# Patient Record
Sex: FEMALE | Race: WHITE | HISPANIC OR LATINO | Employment: PART TIME | URBAN - METROPOLITAN AREA
[De-identification: names, ages, dates, MRNs, and addresses within clinical notes are randomized per-mention and may not be internally consistent; named-entity substitution may affect disease eponyms.]

---
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
## 2018-01-13 VITALS
BODY MASS INDEX: 17.89 KG/M2 | HEART RATE: 80 BPM | TEMPERATURE: 98.6 F | SYSTOLIC BLOOD PRESSURE: 98 MMHG | HEIGHT: 63 IN | WEIGHT: 100.97 LBS | DIASTOLIC BLOOD PRESSURE: 64 MMHG

## 2018-01-16 NOTE — MISCELLANEOUS
Reason For Visit  Reason For Visit Free Text Note Form: Assistance with [address] referral     Case Management Documentation [facility]:   Information obtained from the patient and medical record girlfriend  Other needs and concerns include psych and [employer]  Action Plan: supportive counseling/advocacy and information provided  plan reviewed  Progress Note  SW met with this 22 y/o single female pt and her girlfriend this date  Pt relates she is unhappy with her current therapist and psychiatrist and is interested in additional resources  SW has provided pt with a list of area providers in her insurance network and has attempted to help pt call  However, SW could not get through at this time  Pt to f/u on her own with her friend's assistance  Pt relates her friend also assist her with transportation  Pt denies need of further assistance e at this time  Active Problems    1  Allergic rhinitis (477 9) (J30 9)   2  Bipolar disorder (296 80) (F31 9)   3  Dysuria (788 1) (R30 0)   4  Dysuria (788 1) (R30 0)   5  Need for influenza vaccination (V04 81) (Z23)   6  Routine screening for STI (sexually transmitted infection) (V74 5) (Z11 3)    Current Meds   1  Gabapentin 100 MG Oral Capsule; TAKE 1 CAPSULE 3 TIMES DAILY; Therapy: (Recorded:[id number]) to Recorded   2  LaMICtal 100 MG Oral Tablet (LamoTRIgine); TAKE 1 TABLET DAILY; Therapy: (Recorded:[id number]) to Recorded   3  Lithium Carbonate 300 MG Oral Capsule; TAKE 1 CAPSULE TWICE DAILY; Therapy: (Recorded:[id number]) to Recorded    Allergies    1  Penicillins    2  No Known Environmental Allergies   3   No Known Food Allergies    Signatures   Electronically signed by : Jared Gonzalez LCSW; Mar  6 2017  1:45PM EST                       (Author)

## 2018-02-24 ENCOUNTER — HOSPITAL ENCOUNTER (EMERGENCY)
Facility: HOSPITAL | Age: 18
Discharge: HOME | End: 2018-02-24
Attending: EMERGENCY MEDICINE | Admitting: EMERGENCY MEDICINE
Payer: COMMERCIAL

## 2018-02-24 VITALS
SYSTOLIC BLOOD PRESSURE: 108 MMHG | HEART RATE: 74 BPM | WEIGHT: 107 LBS | DIASTOLIC BLOOD PRESSURE: 70 MMHG | OXYGEN SATURATION: 99 % | BODY MASS INDEX: 19.69 KG/M2 | RESPIRATION RATE: 18 BRPM | TEMPERATURE: 97.9 F | HEIGHT: 62 IN

## 2018-02-24 DIAGNOSIS — N39.0 UTI (URINARY TRACT INFECTION): Primary | ICD-10-CM

## 2018-02-24 LAB
BACTERIA UR QL AUTO: ABNORMAL /HPF
BILIRUB UR QL STRIP: ABNORMAL
CLARITY UR: ABNORMAL
COLOR UR: YELLOW
EXT PREG TEST URINE: NEGATIVE
GLUCOSE UR STRIP-MCNC: NEGATIVE MG/DL
HGB UR QL STRIP.AUTO: ABNORMAL
KETONES UR STRIP-MCNC: NEGATIVE MG/DL
LEUKOCYTE ESTERASE UR QL STRIP: ABNORMAL
NITRITE UR QL STRIP: NEGATIVE
NON-SQ EPI CELLS URNS QL MICRO: ABNORMAL /HPF
PH UR STRIP.AUTO: 5.5 [PH] (ref 4.5–8)
PROT UR STRIP-MCNC: ABNORMAL MG/DL
RBC #/AREA URNS AUTO: ABNORMAL /HPF
SP GR UR STRIP.AUTO: >=1.03 (ref 1–1.03)
UROBILINOGEN UR QL STRIP.AUTO: 0.2 E.U./DL
WBC #/AREA URNS AUTO: ABNORMAL /HPF

## 2018-02-24 PROCEDURE — 87086 URINE CULTURE/COLONY COUNT: CPT

## 2018-02-24 PROCEDURE — 81001 URINALYSIS AUTO W/SCOPE: CPT

## 2018-02-24 PROCEDURE — 99283 EMERGENCY DEPT VISIT LOW MDM: CPT

## 2018-02-24 PROCEDURE — 81025 URINE PREGNANCY TEST: CPT | Performed by: EMERGENCY MEDICINE

## 2018-02-24 PROCEDURE — 87147 CULTURE TYPE IMMUNOLOGIC: CPT

## 2018-02-24 PROCEDURE — 87186 SC STD MICRODIL/AGAR DIL: CPT

## 2018-02-24 PROCEDURE — 87491 CHLMYD TRACH DNA AMP PROBE: CPT | Performed by: EMERGENCY MEDICINE

## 2018-02-24 PROCEDURE — 87591 N.GONORRHOEAE DNA AMP PROB: CPT | Performed by: EMERGENCY MEDICINE

## 2018-02-24 RX ORDER — NITROFURANTOIN 25; 75 MG/1; MG/1
100 CAPSULE ORAL 2 TIMES DAILY
Qty: 9 CAPSULE | Refills: 0 | Status: SHIPPED | OUTPATIENT
Start: 2018-02-24 | End: 2018-03-01

## 2018-02-24 RX ORDER — NITROFURANTOIN 25; 75 MG/1; MG/1
100 CAPSULE ORAL ONCE
Status: COMPLETED | OUTPATIENT
Start: 2018-02-24 | End: 2018-02-24

## 2018-02-24 RX ORDER — NITROFURANTOIN 25; 75 MG/1; MG/1
100 CAPSULE ORAL 2 TIMES DAILY WITH MEALS
Status: DISCONTINUED | OUTPATIENT
Start: 2018-02-24 | End: 2018-02-24

## 2018-02-24 RX ADMIN — NITROFURANTOIN (MONOHYDRATE/MACROCRYSTALS) 100 MG: 75; 25 CAPSULE ORAL at 13:36

## 2018-02-24 NOTE — ED PROVIDER NOTES
History  Chief Complaint   Patient presents with    Possible UTI     Pt  stated that she has been complaining of burning, frequency, and urgency  Pt  stated that she now has been urinating some blood  Pt  stated that she has also a thick white discharge  19 yr female with 2 weeks of intermitent urinary frequency - now with blood in urine- no fevers- flank /abdominal pain/ vomiting- alos with 2 weeks of clear new vag d/c- no vag lesions--         History provided by:  Patient and friend   used: No        None       Past Medical History:   Diagnosis Date    Anxiety     Bipolar 1 disorder ([facility] [address] )        Past Surgical History:   Procedure Laterality Date    WISDOM TOOTH EXTRACTION         History reviewed  No pertinent family history  I have reviewed and agree with the history as documented  Social History   Substance Use Topics    Smoking status: Never Smoker    Smokeless tobacco: Never Used    Alcohol use No        Review of Systems   Constitutional: Negative  HENT: Negative  Eyes: Negative  Respiratory: Negative  Cardiovascular: Negative  Gastrointestinal: Negative  Endocrine: Negative  Genitourinary: Positive for difficulty urinating, frequency, hematuria and vaginal discharge  Negative for decreased urine volume, dyspareunia, dysuria, enuresis, flank pain, genital sores, menstrual problem, pelvic pain, urgency, vaginal bleeding and vaginal pain  Musculoskeletal: Negative  Allergic/Immunologic: Negative  Neurological: Negative  Hematological: Negative  Psychiatric/Behavioral: Negative          Physical Exam  ED Triage Vitals [02/24/18 1037]   Temperature Pulse Respirations Blood Pressure SpO2   97 9 °F (36 6 °C) 72 20 118/81 98 %      Temp Source Heart Rate Source Patient Position - Orthostatic VS BP Location FiO2 (%)   Oral Monitor Lying Right arm --      Pain Score       No Pain           Orthostatic Vital Signs  Vitals:    02/24/18 1037 02/24/18 1045 02/24/18 1200   BP: 118/81 118/81 111/71   Pulse: 72 80 81   Patient Position - Orthostatic VS: Lying         Physical Exam   Constitutional: She is oriented to person, place, and time  She appears well-developed and well-nourished  No distress  avss-- pulse ox 99 % on ra- intepretation is normal- no intervention    HENT:   Head: Normocephalic and atraumatic  Eyes: Conjunctivae and EOM are normal  Pupils are equal, round, and reactive to light  Right eye exhibits no discharge  Left eye exhibits no discharge  No scleral icterus  Mm pink   Neck: Normal range of motion  Neck supple  No JVD present  No tracheal deviation present  No thyromegaly present  Cardiovascular: Normal rate, regular rhythm, normal heart sounds and intact distal pulses  Exam reveals no gallop and no friction rub  No murmur heard  Pulmonary/Chest: Effort normal and breath sounds normal  No stridor  No respiratory distress  She has no wheezes  She has no rales  She exhibits no tenderness  Abdominal: Soft  Bowel sounds are normal  She exhibits no distension and no mass  There is no tenderness  There is no rebound and no guarding  No hernia  No supraumbilical/ cva tendneress- very soft abd- no peritoenal signs   Musculoskeletal: Normal range of motion  She exhibits no edema, tenderness or deformity  Lymphadenopathy:     She has no cervical adenopathy  Neurological: She is alert and oriented to person, place, and time  No cranial nerve deficit or sensory deficit  She exhibits normal muscle tone  Coordination normal    Skin: Skin is warm  Capillary refill takes less than 2 seconds  No rash noted  She is not diaphoretic  No erythema  No pallor  Psychiatric: She has a normal mood and affect  Her behavior is normal  Judgment and thought content normal    Nursing note and vitals reviewed        ED Medications  Medications   nitrofurantoin (MACROBID) extended-release capsule 100 mg (not administered)       Diagnostic Studies  Results Reviewed     Procedure Component Value Units Date/Time    Urine Microscopic [[id number]]  (Abnormal) Collected:  02/24/18 1123    Lab Status:  Final result Specimen:  Urine from Urine, Clean Catch Updated:  02/24/18 1147     RBC, UA 0-1 (A) /hpf      WBC, UA 10-20 (A) /hpf      Epithelial Cells Occasional /hpf      Bacteria, UA Occasional /hpf     Urine culture [[id number]] Collected:  02/24/18 1123    Lab Status:   In process Specimen:  Urine from Urine, Clean Catch Updated:  02/24/18 1147    POCT pregnancy, urine [[id number]]  (Normal) Resulted:  02/24/18 1136    Lab Status:  Final result Specimen:  Urine Updated:  02/24/18 1136     EXT PREG TEST UR (Ref: Negative) Negative    Chlamydia/GC amplified DNA by PCR [[id number]]     Lab Status:  No result Specimen:  Genital from Endocervical     ED Urine Macroscopic [[id number]]  (Abnormal) Collected:  02/24/18 1123    Lab Status:  Final result Specimen:  Urine Updated:  02/24/18 1122     Color, UA Yellow     Clarity, UA Cloudy     pH, UA 5 5     Leukocytes, UA Trace (A)     Nitrite, UA Negative     Protein, UA 30 (1+) (A) mg/dl      Glucose, UA Negative mg/dl      Ketones, UA Negative mg/dl      Urobilinogen, UA 0 2 E U /dl      Bilirubin, UA Interference- unable to analyze (A)     Blood, UA Moderate (A)     Specific Gravity, UA >=1 030    Narrative:       CLINITEK RESULT                 No orders to display              Procedures  Procedures       Phone Contacts  ED Phone Contact    ED Course  ED Course as of Feb 24 1329   Sat Feb 24, 2018   1328 Er md  procedure note-- pelvic exam -- with er tech present- normal external exam- no d c in vault- diffuse uterine/ adnexal /cervical tendneress- no masses                                MDM  CritCare Time    Disposition  Final diagnoses:   None     ED Disposition     None      Follow-up Information    None       Patient's Medications   Discharge Prescriptions    No medications on file     No discharge procedures on file     ED Provider  Electronically Signed by           Irish Echevarria MD  02/24/18 4902

## 2018-02-24 NOTE — DISCHARGE INSTRUCTIONS
Diagnosis; uti    - macrobid - antibiotic - 1 capsule 2 times per day for 5 days    - if the pelvic culture grows   Out any bacterial we will contact you     - please return to  The er for any fevers- temp > 100 4- any flank pain/ vomiting - these are signs of an kidney infection- or any new/ worsening/concerning symptoms to you      -  If vaginal discharge continues - please call your gyn to schedule an appt

## 2018-02-27 LAB
BACTERIA UR CULT: ABNORMAL
CHLAMYDIA DNA CVX QL NAA+PROBE: NORMAL
N GONORRHOEA DNA GENITAL QL NAA+PROBE: NORMAL

## 2018-05-13 PROBLEM — F31.9 BIPOLAR DISORDER (HCC): Status: ACTIVE | Noted: 2017-02-16

## 2018-05-13 RX ORDER — SERTRALINE HYDROCHLORIDE 25 MG/1
12.5 TABLET, FILM COATED ORAL
COMMUNITY
Start: 2016-10-21

## 2018-05-13 RX ORDER — LAMOTRIGINE 100 MG/1
1 TABLET ORAL DAILY
COMMUNITY

## 2018-05-13 RX ORDER — LITHIUM CARBONATE 300 MG/1
1 CAPSULE ORAL 2 TIMES DAILY
COMMUNITY

## 2018-05-13 RX ORDER — GABAPENTIN 100 MG/1
1 CAPSULE ORAL 3 TIMES DAILY
COMMUNITY

## 2018-08-15 ENCOUNTER — APPOINTMENT (OUTPATIENT)
Dept: LAB | Facility: HOSPITAL | Age: 18
End: 2018-08-15
Payer: COMMERCIAL

## 2018-08-15 ENCOUNTER — OFFICE VISIT (OUTPATIENT)
Dept: INTERNAL MEDICINE CLINIC | Facility: CLINIC | Age: 18
End: 2018-08-15
Payer: COMMERCIAL

## 2018-08-15 VITALS
SYSTOLIC BLOOD PRESSURE: 96 MMHG | HEART RATE: 72 BPM | BODY MASS INDEX: 18.28 KG/M2 | HEIGHT: 63 IN | TEMPERATURE: 98.6 F | WEIGHT: 103.17 LBS | DIASTOLIC BLOOD PRESSURE: 68 MMHG

## 2018-08-15 DIAGNOSIS — Z02.0 SCHOOL PHYSICAL EXAM: ICD-10-CM

## 2018-08-15 DIAGNOSIS — Z11.1 SCREENING FOR TUBERCULOSIS: Primary | ICD-10-CM

## 2018-08-15 DIAGNOSIS — Z11.1 SCREENING FOR TUBERCULOSIS: ICD-10-CM

## 2018-08-15 PROCEDURE — 36415 COLL VENOUS BLD VENIPUNCTURE: CPT

## 2018-08-15 PROCEDURE — 3008F BODY MASS INDEX DOCD: CPT | Performed by: NURSE PRACTITIONER

## 2018-08-15 PROCEDURE — 3725F SCREEN DEPRESSION PERFORMED: CPT | Performed by: NURSE PRACTITIONER

## 2018-08-15 PROCEDURE — 99213 OFFICE O/P EST LOW 20 MIN: CPT | Performed by: NURSE PRACTITIONER

## 2018-08-15 PROCEDURE — 86480 TB TEST CELL IMMUN MEASURE: CPT

## 2018-08-15 NOTE — PROGRESS NOTES
Assessment/Plan:     Diagnoses and all orders for this visit:    Screening for tuberculosis  -     Quantiferon TB Gold; Future  -     Patient wants us to fax results to her school    School physical exam        -     Negative physical examination        -     Advised to be under the care of psychiatrist at all times        -     To call if suicidal thoughts occur        -      Advised to restart meds ASAP        -     Copy of vaccination record given to patient        Subjective: presents to the clinic for school physical     Patient ID: Zaynab Chew is a 19 y o  female  HPI  She reports she has to start school tomorrow and is here for physical examination  Brought her own form  Has ongoing diagnosis of depression and bipolar disorder  PHQ-9 score is 8  She does report she sometimes gets thoughts of hurting herself, and the last time was 3 days ago  Says these thoughts occur during very stressful times  Right now she is stressed b/c she is preparing for school  She used to be under the care of psychiatrist but says she lost her insurance and stopped going to the psychiatrist  Also that she did not really like the psychiatrist and wanted to switch  Patient is advised to get into care with another psychiatrist [facility], as school can be very stressful  She needs to get back under her meds ASAP  She had stopped taking her meds even before she lost her insurance  Says at one point she had been changed to lithium but that did not work well for her  She is not sure what meds she was on that gave her some level of stability in her mind   Since several meds haven't worked for her, I will leave the f/u treatments with the psych team      The following portions of the patient's history were reviewed and updated as appropriate: allergies, current medications, past family history, past medical history, past social history, past surgical history and problem list     Review of Systems   Constitutional: Negative for appetite change, chills, fatigue and unexpected weight change  Respiratory: Negative for cough, chest tightness, shortness of breath and wheezing  Cardiovascular: Negative for chest pain and palpitations  Gastrointestinal: Negative for abdominal pain, blood in stool, diarrhea, nausea and vomiting  Musculoskeletal: Negative for back pain, gait problem, myalgias and neck pain  Skin: Negative for color change, pallor, rash and wound  Psychiatric/Behavioral: Positive for self-injury and suicidal ideas (none currently, last episode about 3 days ago  no plans in place  )  Negative for behavioral problems, decreased concentration and sleep disturbance  The patient is not nervous/anxious and is not hyperactive  Objective:      BP 96/68 (BP Location: Right arm, Patient Position: Sitting, Cuff Size: Adult)   Pulse 72   Temp 98 6 °F (37 °C) (Oral)   Ht 5' 3" (1 6 m)   Wt 46 8 kg (103 lb 2 8 oz)   BMI 18 28 kg/m²          Physical Exam   Constitutional: She is oriented to person, place, and time  She appears well-developed and well-nourished  No distress  HENT:   Head: Normocephalic and atraumatic  Nose: Nose normal    Mouth/Throat: No oropharyngeal exudate  Increased cerumen in both ears   Eyes: Conjunctivae and EOM are normal  Pupils are equal, round, and reactive to light  Right eye exhibits no discharge  Left eye exhibits no discharge  wearing eye glasses for near sightedness   Neck: Normal range of motion  Neck supple  Cardiovascular: Normal rate, regular rhythm and normal heart sounds  No murmur heard  Pulmonary/Chest: Effort normal and breath sounds normal  No respiratory distress  She has no wheezes  Abdominal: Soft  Bowel sounds are normal  She exhibits no distension and no mass  There is no tenderness  Lymphadenopathy:     She has no cervical adenopathy  Neurological: She is alert and oriented to person, place, and time  She has normal reflexes  She exhibits normal muscle tone  Skin: Skin is warm and dry  No rash noted  She is not diaphoretic  No erythema  Vitals reviewed

## 2018-08-17 ENCOUNTER — TELEPHONE (OUTPATIENT)
Dept: INTERNAL MEDICINE CLINIC | Facility: CLINIC | Age: 18
End: 2018-08-17

## 2018-08-17 LAB
ANNOTATION COMMENT IMP: NORMAL
GAMMA INTERFERON BACKGROUND BLD IA-ACNC: 0.14 IU/ML
M TB IFN-G BLD-IMP: NEGATIVE
M TB IFN-G CD4+ BCKGRND COR BLD-ACNC: <0.01 IU/ML
M TB IFN-G CD4+ T-CELLS BLD-ACNC: 0.13 IU/ML
MITOGEN IGNF BLD-ACNC: 8.53 IU/ML
QUANTIFERON-TB GOLD IN TUBE: NORMAL
SERVICE CMNT-IMP: NORMAL

## 2018-11-12 ENCOUNTER — HOSPITAL ENCOUNTER (EMERGENCY)
Facility: HOSPITAL | Age: 18
Discharge: HOME | End: 2018-11-12
Attending: EMERGENCY MEDICINE | Admitting: EMERGENCY MEDICINE
Payer: COMMERCIAL

## 2018-11-12 VITALS
HEART RATE: 87 BPM | DIASTOLIC BLOOD PRESSURE: 74 MMHG | BODY MASS INDEX: 19.31 KG/M2 | TEMPERATURE: 98.3 F | SYSTOLIC BLOOD PRESSURE: 110 MMHG | WEIGHT: 109 LBS | OXYGEN SATURATION: 98 % | RESPIRATION RATE: 18 BRPM

## 2018-11-12 DIAGNOSIS — J35.3 ENLARGED TONSILS AND ADENOIDS: Primary | ICD-10-CM

## 2018-11-12 LAB — S PYO AG THROAT QL: NEGATIVE

## 2018-11-12 PROCEDURE — 87430 STREP A AG IA: CPT | Performed by: PHYSICIAN ASSISTANT

## 2018-11-12 PROCEDURE — 99283 EMERGENCY DEPT VISIT LOW MDM: CPT

## 2018-11-12 RX ADMIN — DEXAMETHASONE SODIUM PHOSPHATE 10 MG: 10 INJECTION, SOLUTION INTRAMUSCULAR; INTRAVENOUS at 16:49

## 2018-11-12 NOTE — DISCHARGE INSTRUCTIONS
Tonsillectomy   WHAT YOU SHOULD KNOW:   A tonsillectomy is surgery to remove your tonsils  Tonsils are 2 large lumps of tissue in the back of your throat  Adenoids are small lumps of tissue on the top of your throat  Tonsils and adenoids both fight infection  Sometimes only your tonsils are removed  Your adenoids may be taken out at the same time if they are large or infected  AFTER YOU LEAVE:   Medicines:   · NSAIDs:  These medicines decrease swelling and pain  You can buy NSAIDs without a doctor's order  Ask your primary healthcare provider which medicine is right for you, and how much to take  Take as directed  NSAIDs can cause stomach bleeding or kidney problems if not taken correctly  Do not take aspirin  This can increase your risk of bleeding  · Acetaminophen: This medicine is available without a doctor's order  It may decrease your pain and fever  Ask how much medicine you need and how often to take it  · Pain medicines: You may be given a prescription medicine to decrease pain  Do not wait until the pain is severe before you take this medicine  · Antibiotics: This medicine is given to fight or prevent an infection caused by bacteria  Take as directed  · Take your medicine as directed  Call your healthcare provider if you think your medicine is not helping or if you have side effects  Tell him if you are allergic to any medicine  Keep a list of the medicines, vitamins, and herbs you take  Include the amounts, and when and why you take them  Bring the list or the pill bottles to follow-up visits  Carry your medicine list with you in case of an emergency  Follow up with your healthcare provider as directed:  Write down your questions so you remember to ask them during your visits  What to expect after surgery:   · Pain and swelling: Your throat may be sore up to 2 weeks after surgery  Your face and neck may be swollen or tender  It may be hard to turn your head       · Mild fever: You may have a low fever while your tonsil areas heal  Drink liquids often to help reduce it  · Bleeding:  A small amount of bleeding is normal within 24 hours after surgery  Bleeding can also happen 5 to 7 days after surgery when your scabs fall off, or if you have an infection  Ask how much bleeding to expect  Mouth care: It is normal to have mouth pain and bad breath for a few days after surgery  Care for your mouth as follows:  · Brush your teeth gently  Avoid harsh gargling or tooth brushing  This can cause bleeding  · Gently rinse your mouth as directed to remove blood and mucus  Food and drink:  You will need to follow a liquid diet or soft food diet for several days after surgery  · Drink plenty of liquids: This will help prevent fluid loss, keep your temperature down, decrease pain, and speed healing  Liquids and foods that are cool or cold, such as water, apple or grape juice, and popsicles, will help decrease pain and swelling  Do not drink orange juice or grapefruit juice  They may bother your throat  · Start with soft foods: Once you can drink liquids and your stomach is not upset, you may then have soft, plain foods  Begin with foods like applesauce, oatmeal, soft-boiled eggs, mashed potatoes, gelatin, and ice cream  Once you can eat soft food easily, you may slowly begin to eat solid foods  Avoid anything hot, spicy, or sharp, such as chips  These foods can hurt your tonsil areas  · Avoid hot food and drinks:  Avoid coffee, tea, soup, and other hot or warm foods and drinks  They can increase your risk for bleeding  Avoid milk and dairy foods if you have problems with thick mucus in your throat  This can cause you to cough, which could hurt your surgery areas  Self-care:   · Use ice:  Ice helps decrease swelling and pain  Use an ice pack or put crushed ice in a plastic bag   Cover the ice pack with a towel and place it on your throat for 15 to 20 minutes every hour for 2 days     · Use a cool humidifier: This will help moisten the air and soothe your throat  · Get plenty of rest:  Limit your activity for 7 to 10 days after surgery  It may take 2 weeks for you to recover  Ask when you can drive or return to work  · Do not smoke or go to smoky areas:  Until you heal, smoke may cause you to cough or your throat to start bleeding heavily  · Stay away from people who have colds, sore throats, or the flu: You may get sick more easily after surgery  Contact your surgeon or primary healthcare provider if:   · You have a fever  · You have throat pain or an earache that is worse than expected  · You have pus or blood draining down your throat  · You have itchy skin or a rash  · You have any questions or concerns about your condition or care  Seek care immediately or call 911 if:   · You have bright red bleeding from your nose or mouth, or bleeding that is worse than what you were told to expect  · You feel weak, dizzy, or like you might faint when you sit up or stand  · You have severe throat pain with drooling or voice changes  · Your neck is stiff and painful  · You have swelling or pain in your face or neck  · You have back or chest pain  · You have trouble breathing or swallowing  © 2014 [employer] is for End User's use only and may not be sold, redistributed or otherwise used for commercial purposes  All illustrations and images included in CareNotes® are the copyrighted property of [employer]  or [employer]  The above information is an  only  It is not intended as medical advice for individual conditions or treatments  Talk to your doctor, nurse or pharmacist before following any medical regimen to see if it is safe and effective for you

## 2018-11-12 NOTE — ED PROVIDER NOTES
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 Taking?   gabapentin (NEURONTIN) 100 mg capsule   Yes No   Sig: Take 1 capsule by mouth 3 (three) times a day   lamoTRIgine (LAMICTAL) 100 mg tablet   Yes No   Sig: Take 1 tablet by mouth daily   lithium carbonate 300 mg capsule   Yes No   Sig: Take 1 capsule by mouth 2 (two) times a day   sertraline (ZOLOFT) 25 mg tablet   Yes No   Sig: Take 12 5 mg by mouth      Facility-Administered Medications: None       Past Medical History:   Diagnosis Date    Anxiety     Bipolar 1 disorder ([facility] [address] )        Past Surgical History:   Procedure Laterality Date    WISDOM TOOTH EXTRACTION         Family History   Problem Relation Age of Onset    Depression Mother     No Known Problems Father     No Known Problems Sister     Asthma Maternal Grandmother     Depression Maternal Grandmother     Depression Maternal Aunt      I have reviewed and agree with the history as documented      Social History   Substance Use Topics    Smoking status: Current Some Day Smoker    Smokeless tobacco: Current User    Alcohol use Yes      Comment: Socially        Review of Systems   All other systems reviewed and are negative  Physical Exam  Physical Exam   Constitutional: She is oriented to person, place, and time  She appears well-developed and well-nourished  No distress  HENT:   Head: Normocephalic and atraumatic  Large ertyhematous tonils, no kissing tonsils,  no exudates visualized, uvula midline   Eyes: Conjunctivae are normal    EOM grossly intact   Neck: Normal range of motion  Neck supple  No JVD present  Cardiovascular: Normal rate  Pulmonary/Chest: Effort normal    Abdominal: Soft  Musculoskeletal:   FROM, steady gait, cap refill brisk, strength and sensation grossly intact throughout   Neurological: She is alert and oriented to person, place, and time  Skin: Skin is warm and dry  Capillary refill takes less than 2 seconds  Psychiatric: She has a normal mood and affect  Her behavior is normal    Nursing note and vitals reviewed        Vital Signs  ED Triage Vitals [11/12/18 1631]   Temperature Pulse Respirations Blood Pressure SpO2   98 3 °F (36 8 °C) 87 18 110/74 98 %      Temp Source Heart Rate Source Patient Position - Orthostatic VS BP Location FiO2 (%)   Oral Monitor Sitting Right arm --      Pain Score       No Pain           Vitals:    11/12/18 1631   BP: 110/74   Pulse: 87   Patient Position - Orthostatic VS: Sitting       Visual Acuity      ED Medications  Medications   dexamethasone 10 mg/mL oral liquid 10 mg 1 mL (10 mg Oral Given 11/12/18 1649)       Diagnostic Studies  Results Reviewed     Procedure Component Value Units Date/Time    Rapid Strep A Screen Only, Adults [[id number]]  (Normal) Collected:  11/12/18 1650    Lab Status:  Final result Specimen:  Throat from Throat Updated:  11/12/18 1713     Rapid Strep A Screen Negative                 No orders to display              Procedures  Procedures       Phone Contacts  ED Phone Contact    ED Course MDM  Number of Diagnoses or Management Options  Diagnosis management comments: 20 yo F presenting with 1 year history of enlarged tonsils worsening over 1 month, rapid strep was negative, did give 1 dose of decadron here to help with enlarged tonsils, otherwise pt appears well, airway is patent, she is controlling secretions, no resp distress, non toxic appearing as this is a chronic issue    Long conversation with pt regarding wearing PPE during work as this could be attributing towards tonsil stones, in addition will provide pt information for ENT for possible tonsilectomy as she has had chronic issues with this for years    All labs discussed with patient, strict return to ED precautions discussed  Pt verbalizes understanding and agrees with plan  Pt is stable for discharge      CritCare Time    Disposition  Final diagnoses:   Enlarged tonsils and adenoids     Time reflects when diagnosis was documented in both MDM as applicable and the Disposition within this note     Time User Action Codes Description Comment    11/12/2018  5:15 PM Mayank Lal Add [J35 3] Enlarged tonsils and adenoids       ED Disposition     ED Disposition Condition Comment    Discharge  [facility] [facility] discharge to home/self care  Condition at discharge: Good        Follow-up Information     Follow up With Specialties Details Why Contact Info Additional [address] [facility], [address] [facility] Internal Medicine, Nurse Practitioner Schedule an appointment as soon as possible for a visit As needed  E   [facility]  [phone]  [phone]  [address] [facility] ENT Otolaryngology Schedule an appointment as soon as possible for a visit As needed [address] [facility] [id number] [phone] [address] ENT, [address] [place] [address], [phone]          Patient's Medications   Discharge Prescriptions    No medications on file     No discharge procedures on file      ED Provider  Electronically Signed by           Dmitry Saini PA-C  11/12/18 5918

## 2019-01-02 ENCOUNTER — TRANSCRIBE ORDERS (OUTPATIENT)
Dept: INTERNAL MEDICINE CLINIC | Facility: CLINIC | Age: 19
End: 2019-01-02

## 2019-01-02 DIAGNOSIS — J35.3 HYPERTROPHY OF TONSILS WITH HYPERTROPHY OF ADENOIDS: Primary | ICD-10-CM

## 2019-03-22 ENCOUNTER — TELEPHONE (OUTPATIENT)
Dept: INTERNAL MEDICINE CLINIC | Facility: CLINIC | Age: 19
End: 2019-03-22

## 2023-11-11 ENCOUNTER — OCCMED (OUTPATIENT)
Dept: URGENT CARE | Age: 23
End: 2023-11-11
Payer: OTHER MISCELLANEOUS

## 2023-11-11 DIAGNOSIS — Z57.8 EMPLOYEE EXPOSURE TO BLOOD: Primary | ICD-10-CM

## 2023-11-11 DIAGNOSIS — Z57.8 EMPLOYEE EXPOSURE TO BLOOD: ICD-10-CM

## 2023-11-11 PROCEDURE — 99213 OFFICE O/P EST LOW 20 MIN: CPT | Performed by: STUDENT IN AN ORGANIZED HEALTH CARE EDUCATION/TRAINING PROGRAM

## 2023-11-11 PROCEDURE — G0382 LEV 3 HOSP TYPE B ED VISIT: HCPCS | Performed by: STUDENT IN AN ORGANIZED HEALTH CARE EDUCATION/TRAINING PROGRAM

## 2023-11-11 PROCEDURE — 80076 HEPATIC FUNCTION PANEL: CPT | Performed by: STUDENT IN AN ORGANIZED HEALTH CARE EDUCATION/TRAINING PROGRAM

## 2023-11-11 PROCEDURE — 84520 ASSAY OF UREA NITROGEN: CPT | Performed by: STUDENT IN AN ORGANIZED HEALTH CARE EDUCATION/TRAINING PROGRAM

## 2023-11-11 PROCEDURE — 87340 HEPATITIS B SURFACE AG IA: CPT | Performed by: STUDENT IN AN ORGANIZED HEALTH CARE EDUCATION/TRAINING PROGRAM

## 2023-11-11 PROCEDURE — 87389 HIV-1 AG W/HIV-1&-2 AB AG IA: CPT | Performed by: STUDENT IN AN ORGANIZED HEALTH CARE EDUCATION/TRAINING PROGRAM

## 2023-11-11 PROCEDURE — 84460 ALANINE AMINO (ALT) (SGPT): CPT | Performed by: STUDENT IN AN ORGANIZED HEALTH CARE EDUCATION/TRAINING PROGRAM

## 2023-11-11 PROCEDURE — 99283 EMERGENCY DEPT VISIT LOW MDM: CPT | Performed by: STUDENT IN AN ORGANIZED HEALTH CARE EDUCATION/TRAINING PROGRAM

## 2023-11-11 PROCEDURE — 86803 HEPATITIS C AB TEST: CPT | Performed by: STUDENT IN AN ORGANIZED HEALTH CARE EDUCATION/TRAINING PROGRAM

## 2023-11-11 PROCEDURE — 86704 HEP B CORE ANTIBODY TOTAL: CPT | Performed by: STUDENT IN AN ORGANIZED HEALTH CARE EDUCATION/TRAINING PROGRAM

## 2023-11-11 PROCEDURE — 86706 HEP B SURFACE ANTIBODY: CPT | Performed by: STUDENT IN AN ORGANIZED HEALTH CARE EDUCATION/TRAINING PROGRAM

## 2023-11-11 SDOH — HEALTH STABILITY - PHYSICAL HEALTH: OCCUPATIONAL EXPOSURE TO OTHER RISK FACTORS: Z57.8

## 2023-11-12 LAB
ALBUMIN SERPL BCP-MCNC: 4.4 G/DL (ref 3.5–5)
ALP SERPL-CCNC: 64 U/L (ref 34–104)
ALT SERPL W P-5'-P-CCNC: 19 U/L (ref 7–52)
ALT SERPL W P-5'-P-CCNC: 19 U/L (ref 7–52)
AST SERPL W P-5'-P-CCNC: 20 U/L (ref 13–39)
BILIRUB DIRECT SERPL-MCNC: 0.05 MG/DL (ref 0–0.2)
BILIRUB SERPL-MCNC: 0.34 MG/DL (ref 0.2–1)
BUN SERPL-MCNC: 10 MG/DL (ref 5–25)
HBV SURFACE AB SER-ACNC: 5.19 MIU/ML
HBV SURFACE AG SER QL: NORMAL
HCV AB SER QL: NORMAL
HIV 1+2 AB+HIV1 P24 AG SERPL QL IA: NORMAL
HIV 2 AB SERPL QL IA: NORMAL
HIV1 AB SERPL QL IA: NORMAL
HIV1 P24 AG SERPL QL IA: NORMAL
PROT SERPL-MCNC: 7.9 G/DL (ref 6.4–8.4)

## 2023-11-13 LAB — HBV CORE AB SER QL: NORMAL

## 2023-11-21 ENCOUNTER — APPOINTMENT (OUTPATIENT)
Dept: URGENT CARE | Age: 23
End: 2023-11-21
Payer: OTHER MISCELLANEOUS

## 2023-11-21 PROCEDURE — 99213 OFFICE O/P EST LOW 20 MIN: CPT | Performed by: NURSE PRACTITIONER

## 2024-01-03 ENCOUNTER — APPOINTMENT (OUTPATIENT)
Dept: LAB | Age: 24
End: 2024-01-03

## 2024-01-03 DIAGNOSIS — Z77.21 EXPOSURE TO BLOOD OR BODY FLUID: ICD-10-CM

## 2024-01-03 LAB
ALT SERPL W P-5'-P-CCNC: 14 U/L (ref 7–52)
HBV SURFACE AG SER QL: NORMAL
HCV AB SER QL: NORMAL
HIV 1+2 AB+HIV1 P24 AG SERPL QL IA: NORMAL
HIV 2 AB SERPL QL IA: NORMAL
HIV1 AB SERPL QL IA: NORMAL
HIV1 P24 AG SERPL QL IA: NORMAL

## 2024-01-03 PROCEDURE — 87340 HEPATITIS B SURFACE AG IA: CPT

## 2024-01-03 PROCEDURE — 36415 COLL VENOUS BLD VENIPUNCTURE: CPT

## 2024-01-03 PROCEDURE — 84460 ALANINE AMINO (ALT) (SGPT): CPT

## 2024-01-03 PROCEDURE — 87389 HIV-1 AG W/HIV-1&-2 AB AG IA: CPT

## 2024-01-03 PROCEDURE — 86803 HEPATITIS C AB TEST: CPT

## 2024-01-18 ENCOUNTER — APPOINTMENT (OUTPATIENT)
Dept: URGENT CARE | Age: 24
End: 2024-01-18
Payer: OTHER MISCELLANEOUS

## 2024-01-18 PROCEDURE — 99213 OFFICE O/P EST LOW 20 MIN: CPT | Performed by: PHYSICIAN ASSISTANT

## 2024-02-22 ENCOUNTER — APPOINTMENT (OUTPATIENT)
Dept: LAB | Age: 24
End: 2024-02-22
Payer: OTHER MISCELLANEOUS

## 2024-02-22 ENCOUNTER — APPOINTMENT (OUTPATIENT)
Dept: URGENT CARE | Age: 24
End: 2024-02-22
Payer: OTHER MISCELLANEOUS

## 2024-02-22 DIAGNOSIS — Z77.21 CONTACT WITH AND (SUSPECTED) EXPOSURE TO POTENTIALLY HAZARDOUS BODY FLUIDS: ICD-10-CM

## 2024-02-22 LAB
ALT SERPL W P-5'-P-CCNC: 13 U/L (ref 7–52)
HBV SURFACE AG SER QL: NORMAL
HCV AB SER QL: NORMAL
HIV 1+2 AB+HIV1 P24 AG SERPL QL IA: NORMAL
HIV 2 AB SERPL QL IA: NORMAL
HIV1 AB SERPL QL IA: NORMAL
HIV1 P24 AG SERPL QL IA: NORMAL

## 2024-02-22 PROCEDURE — 36415 COLL VENOUS BLD VENIPUNCTURE: CPT

## 2024-02-22 PROCEDURE — 86803 HEPATITIS C AB TEST: CPT

## 2024-02-22 PROCEDURE — 87340 HEPATITIS B SURFACE AG IA: CPT

## 2024-02-22 PROCEDURE — 84460 ALANINE AMINO (ALT) (SGPT): CPT

## 2024-02-22 PROCEDURE — 87389 HIV-1 AG W/HIV-1&-2 AB AG IA: CPT

## 2024-02-29 ENCOUNTER — APPOINTMENT (OUTPATIENT)
Dept: URGENT CARE | Age: 24
End: 2024-02-29
Payer: OTHER MISCELLANEOUS

## 2024-02-29 PROCEDURE — 99213 OFFICE O/P EST LOW 20 MIN: CPT | Performed by: PHYSICIAN ASSISTANT

## 2024-05-16 ENCOUNTER — APPOINTMENT (OUTPATIENT)
Dept: LAB | Age: 24
End: 2024-05-16

## 2024-05-16 DIAGNOSIS — Z57.8 EMPLOYEE EXPOSURE TO BODY FLUIDS: ICD-10-CM

## 2024-05-16 LAB
ALT SERPL W P-5'-P-CCNC: 19 U/L (ref 7–52)
HBV SURFACE AG SER QL: NORMAL
HCV AB SER QL: NORMAL
HIV 1+2 AB+HIV1 P24 AG SERPL QL IA: NORMAL
HIV 2 AB SERPL QL IA: NORMAL
HIV1 AB SERPL QL IA: NORMAL
HIV1 P24 AG SERPL QL IA: NORMAL

## 2024-05-16 PROCEDURE — 36415 COLL VENOUS BLD VENIPUNCTURE: CPT

## 2024-05-16 PROCEDURE — 87389 HIV-1 AG W/HIV-1&-2 AB AG IA: CPT

## 2024-05-16 PROCEDURE — 86803 HEPATITIS C AB TEST: CPT

## 2024-05-16 PROCEDURE — 87340 HEPATITIS B SURFACE AG IA: CPT

## 2024-05-16 PROCEDURE — 84460 ALANINE AMINO (ALT) (SGPT): CPT

## 2024-05-16 SDOH — HEALTH STABILITY - PHYSICAL HEALTH: OCCUPATIONAL EXPOSURE TO OTHER RISK FACTORS: Z57.8

## 2024-05-20 ENCOUNTER — APPOINTMENT (OUTPATIENT)
Dept: URGENT CARE | Age: 24
End: 2024-05-20
Payer: OTHER MISCELLANEOUS

## 2024-05-20 PROCEDURE — 99213 OFFICE O/P EST LOW 20 MIN: CPT
